# Patient Record
Sex: MALE | Race: WHITE
[De-identification: names, ages, dates, MRNs, and addresses within clinical notes are randomized per-mention and may not be internally consistent; named-entity substitution may affect disease eponyms.]

---

## 2017-10-18 NOTE — XR
EXAMINATION TYPE: XR chest 2V

 

DATE OF EXAM: 10/18/2017

 

COMPARISON: 11/3/2016

 

INDICATION: Annual physical

 

TECHNIQUE:  Frontal and lateral views of the chest are obtained.

 

FINDINGS:  

The heart size is normal.  

The pulmonary vasculature is normal.

The lungs are clear.

 

IMPRESSION:  

1. No acute pulmonary process.

## 2018-08-09 NOTE — US
EXAMINATION TYPE: US abdomen complete

 

DATE OF EXAM: 8/9/2018

 

COMPARISON: CT chest abdomen and pelvis January 16, 2015

 

CLINICAL HISTORY: R10.11 right upper quadrant ABD PAIN, R10.13 epigastric PAIN,R94.5 ABN LIVER.

 

EXAM MEASUREMENTS:

 

Liver Length:  18.5 cm   

Gallbladder Wall:  0.2 cm   

CBD:  0.5 cm

Spleen:  11.4 cm   

Right Kidney:  11.3 x 5.8 x 6.9 cm 

Left Kidney:  11.4x 6.5 x 6.4 cm   

 

 

 

Pancreas:  Obscured by bowel gas

Liver:  Increased attenuation  

Gallbladder:  No stones seen

**Evidence for sonographic Mitchell's sign:  no

CBD:  wnl 

Spleen:  wnl   

Right Kidney:  No hydronephrosis or masses seen   

Left Kidney:  No hydronephrosis or masses seen   

Upper IVC:  wnl  

Abd Aorta:  Obscured by overlying bowel gas

 

 

 

The visualized liver is slightly heterogeneously hyperechoic without worrisome mass or ductal dilatat
ion.  The intrahepatic portion of the IVC and visualized abdominal aorta are within normal limits.  T
here is no evidence of cholelithiasis.  Common bile duct is unremarkable.  The pancreas is suboptimal
ly evaluated as it is obscured by overlying bowel gas on images saved per technologist.  The spleen i
s unremarkable.  Kidneys are symmetric and free of hydronephrosis.  No renal lesions are seen.

 

IMPRESSION: Slightly heterogeneous hyperechoic appearance of liver is likely on basis of mild diffuse
 fatty infiltration.

## 2019-08-28 ENCOUNTER — HOSPITAL ENCOUNTER (OUTPATIENT)
Dept: HOSPITAL 47 - RADUSWWP | Age: 44
Discharge: HOME | End: 2019-08-28
Attending: INTERNAL MEDICINE
Payer: COMMERCIAL

## 2019-08-28 DIAGNOSIS — R16.0: ICD-10-CM

## 2019-08-28 DIAGNOSIS — K76.0: Primary | ICD-10-CM

## 2019-08-28 PROCEDURE — 76700 US EXAM ABDOM COMPLETE: CPT

## 2019-08-28 NOTE — US
EXAMINATION TYPE: US abdomen complete

 

DATE OF EXAM: 8/28/2019

 

COMPARISON:

 

CLINICAL HISTORY: R10.9 Abdominal Pain. Patient states having back pain.  NPO.  No prior surgeries.

 

EXAM MEASUREMENTS:

 

Liver Length:  18.8 cm   

Gallbladder Wall:  0.2 cm   

CBD:  0.3 cm

Spleen:  12.8 cm   

Right Kidney:  11.9 x 7.0 x 5.8 cm 

Left Kidney:  11.4 x 5.9 x 6.9 cm   

 

 

 

Pancreas:  wnl

Liver:  Appears enlarged and coarse  

Gallbladder:  wnl

**Evidence for sonographic Mitchell's sign:  neg

CBD:  wnl 

Spleen:  wnl   

Right Kidney:  wnl   

Left Kidney:  wnl   

Upper IVC:  wnl  

Abd Aorta:  No AAA visualized

 

 

 

IMPRESSION: 

1. Hepatomegaly with mild fatty infiltration of the liver

## 2020-01-06 ENCOUNTER — HOSPITAL ENCOUNTER (OUTPATIENT)
Dept: HOSPITAL 47 - RADCTMAIN | Age: 45
Discharge: HOME | End: 2020-01-06
Attending: INTERNAL MEDICINE
Payer: COMMERCIAL

## 2020-01-06 DIAGNOSIS — N40.0: Primary | ICD-10-CM

## 2020-01-06 DIAGNOSIS — R19.5: ICD-10-CM

## 2020-01-06 DIAGNOSIS — R16.2: ICD-10-CM

## 2020-01-06 PROCEDURE — 74177 CT ABD & PELVIS W/CONTRAST: CPT

## 2020-01-06 NOTE — CT
EXAMINATION TYPE: CT abdomen pelvis w con

 

DATE OF EXAM: 1/6/2020

 

COMPARISON: 1/16/2015

 

HISTORY: 44-year-old male with hepatomegaly, RUQ pain

 

TECHNIQUE: Contiguous axial scanning of the abdomen and pelvis following administration of 100 ml Iso
benjamin 300 IV contrast.  Delayed images through the kidneys and coronal/sagittal reconstructions perform
ed.

 

CT DLP: 963.8 mGycm

Automated exposure control for dose reduction was used.

 

 

FINDINGS: 

Heart normal size without pericardial effusion. Lung bases clear without pleural effusion.

 

Liver mildly enlarged at 18.8 cm without focal lesion. Portal venous system is patent. No biliary jailene
bernabe dilatation.

 

Gallbladder, adrenal glands, kidneys, and pancreas appear within normal limits. Spleen with hilar spl
enule and borderline splenomegaly at 13.9 cm.

 

No dilated small bowel, free fluid, or free air. No mesenteric or retroperitoneal lymphadenopathy.

 

Normal appendix. Moderate stool burden. Oral contrast progressed to the hepatic flexure. No pericolon
ic inflammatory change.

 

Bladder urine distended. Multiple pelvic phleboliths. Prostate gland mildly enlarged at 4.4 cm wide. 
No abnormal fluid collection in the pelvis or pelvic lymphadenopathy.

 

Bones: Stable benign bone islands in the pelvis and left hip.

 

 

IMPRESSION: 

 

1. MILD HEPATOMEGALY AT 18.8 CM. BORDERLINE SPLENOMEGALY OF 13.9 CM.

2. MILD PROSTATIC ENLARGEMENT AT 4.4 CM WIDE.

3. MODERATE STOOL BURDEN.

## 2020-02-07 ENCOUNTER — HOSPITAL ENCOUNTER (OUTPATIENT)
Dept: HOSPITAL 47 - LABWHC1 | Age: 45
Discharge: HOME | End: 2020-02-07
Payer: COMMERCIAL

## 2020-02-07 DIAGNOSIS — R74.8: Primary | ICD-10-CM

## 2020-02-07 LAB
ALBUMIN SERPL-MCNC: 4.5 G/DL (ref 3.8–4.9)
ALBUMIN/GLOB SERPL: 1.96 G/DL (ref 1.6–3.17)
ALP SERPL-CCNC: 47 U/L (ref 41–126)
ALT SERPL-CCNC: 66 U/L (ref 10–49)
AST SERPL-CCNC: 38 U/L (ref 14–35)
BILIRUB INDIRECT SERPL-MCNC: 1 MG/DL
ERYTHROCYTE [DISTWIDTH] IN BLOOD BY AUTOMATED COUNT: 4.91 M/UL (ref 4.3–5.9)
ERYTHROCYTE [DISTWIDTH] IN BLOOD: 13 % (ref 11.5–15.5)
FERRITIN SERPL-MCNC: 213.8 NG/ML (ref 22–322)
GLOBULIN SER CALC-MCNC: 2.3 G/DL (ref 1.6–3.3)
HCT VFR BLD AUTO: 44.3 % (ref 39–53)
HGB BLD-MCNC: 14.5 GM/DL (ref 13–17.5)
INR PPP: 1 (ref ?–1.2)
IRON SERPL-MCNC: 116 UG/DL (ref 65–175)
MCH RBC QN AUTO: 29.4 PG (ref 25–35)
MCHC RBC AUTO-ENTMCNC: 32.6 G/DL (ref 31–37)
MCV RBC AUTO: 90.2 FL (ref 80–100)
PLATELET # BLD AUTO: 152 K/UL (ref 150–450)
PROT SERPL-MCNC: 6.8 G/DL (ref 6.2–8.2)
PT BLD: 10.4 SEC (ref 9–12)
TIBC SERPL-MCNC: 315 UG/DL (ref 228–460)
WBC # BLD AUTO: 4.3 K/UL (ref 3.8–10.6)

## 2020-02-07 PROCEDURE — 85027 COMPLETE CBC AUTOMATED: CPT

## 2020-02-07 PROCEDURE — 84165 PROTEIN E-PHORESIS SERUM: CPT

## 2020-02-07 PROCEDURE — 86038 ANTINUCLEAR ANTIBODIES: CPT

## 2020-02-07 PROCEDURE — 85610 PROTHROMBIN TIME: CPT

## 2020-02-07 PROCEDURE — 82390 ASSAY OF CERULOPLASMIN: CPT

## 2020-02-07 PROCEDURE — 80076 HEPATIC FUNCTION PANEL: CPT

## 2020-02-07 PROCEDURE — 82728 ASSAY OF FERRITIN: CPT

## 2020-02-07 PROCEDURE — 83550 IRON BINDING TEST: CPT

## 2020-02-07 PROCEDURE — 83540 ASSAY OF IRON: CPT

## 2020-02-07 PROCEDURE — 36415 COLL VENOUS BLD VENIPUNCTURE: CPT

## 2020-02-07 PROCEDURE — 83516 IMMUNOASSAY NONANTIBODY: CPT

## 2020-02-07 PROCEDURE — 80074 ACUTE HEPATITIS PANEL: CPT

## 2020-02-10 LAB
ALBUMIN SERPL ELPH-MCNC: 4.07 G/DL (ref 3.8–4.9)
GAMMA GLOB SERPL ELPH-MCNC: 1.09 G/DL (ref 0.7–1.5)

## 2020-06-17 ENCOUNTER — HOSPITAL ENCOUNTER (OUTPATIENT)
Dept: HOSPITAL 47 - LABWHC1 | Age: 45
Discharge: HOME | End: 2020-06-17
Attending: INTERNAL MEDICINE
Payer: COMMERCIAL

## 2020-06-17 DIAGNOSIS — E78.2: ICD-10-CM

## 2020-06-17 DIAGNOSIS — F41.9: ICD-10-CM

## 2020-06-17 DIAGNOSIS — K21.0: Primary | ICD-10-CM

## 2020-06-17 LAB
ALBUMIN SERPL-MCNC: 4.7 G/DL (ref 3.8–4.9)
ALBUMIN/GLOB SERPL: 1.88 G/DL (ref 1.6–3.17)
ALP SERPL-CCNC: 44 U/L (ref 41–126)
ALT SERPL-CCNC: 41 U/L (ref 10–49)
ANION GAP SERPL CALC-SCNC: 6.9 MMOL/L (ref 4–12)
AST SERPL-CCNC: 31 U/L (ref 14–35)
BASOPHILS # BLD AUTO: 0 K/UL (ref 0–0.2)
BASOPHILS NFR BLD AUTO: 0 %
BUN SERPL-SCNC: 18 MG/DL (ref 9–27)
BUN/CREAT SERPL: 20 RATIO (ref 12–20)
CALCIUM SPEC-MCNC: 9.6 MG/DL (ref 8.7–10.3)
CHLORIDE SERPL-SCNC: 104 MMOL/L (ref 96–109)
CHOLEST SERPL-MCNC: 135 MG/DL (ref 0–200)
CO2 SERPL-SCNC: 30.1 MMOL/L (ref 21.6–31.8)
EOSINOPHIL # BLD AUTO: 0.1 K/UL (ref 0–0.7)
EOSINOPHIL NFR BLD AUTO: 2 %
ERYTHROCYTE [DISTWIDTH] IN BLOOD BY AUTOMATED COUNT: 5.28 M/UL (ref 4.3–5.9)
ERYTHROCYTE [DISTWIDTH] IN BLOOD: 12.9 % (ref 11.5–15.5)
GLOBULIN SER CALC-MCNC: 2.5 G/DL (ref 1.6–3.3)
GLUCOSE SERPL-MCNC: 93 MG/DL (ref 70–110)
HCT VFR BLD AUTO: 48.1 % (ref 39–53)
HDLC SERPL-MCNC: 50 MG/DL (ref 40–60)
HGB BLD-MCNC: 15.9 GM/DL (ref 13–17.5)
LDLC SERPL CALC-MCNC: 70 MG/DL (ref 0–131)
LYMPHOCYTES # SPEC AUTO: 1.9 K/UL (ref 1–4.8)
LYMPHOCYTES NFR SPEC AUTO: 39 %
MCH RBC QN AUTO: 30.1 PG (ref 25–35)
MCHC RBC AUTO-ENTMCNC: 33 G/DL (ref 31–37)
MCV RBC AUTO: 91.2 FL (ref 80–100)
MONOCYTES # BLD AUTO: 0.3 K/UL (ref 0–1)
MONOCYTES NFR BLD AUTO: 6 %
NEUTROPHILS # BLD AUTO: 2.4 K/UL (ref 1.3–7.7)
NEUTROPHILS NFR BLD AUTO: 50 %
PLATELET # BLD AUTO: 158 K/UL (ref 150–450)
POTASSIUM SERPL-SCNC: 4.6 MMOL/L (ref 3.5–5.5)
PROT SERPL-MCNC: 7.2 G/DL (ref 6.2–8.2)
SODIUM SERPL-SCNC: 141 MMOL/L (ref 135–145)
TRIGL SERPL-MCNC: 75 MG/DL (ref 0–149)
VLDLC SERPL CALC-MCNC: 15 MG/DL (ref 5–40)
WBC # BLD AUTO: 4.8 K/UL (ref 3.8–10.6)

## 2020-06-17 PROCEDURE — 84443 ASSAY THYROID STIM HORMONE: CPT

## 2020-06-17 PROCEDURE — 80061 LIPID PANEL: CPT

## 2020-06-17 PROCEDURE — 80053 COMPREHEN METABOLIC PANEL: CPT

## 2020-06-17 PROCEDURE — 36415 COLL VENOUS BLD VENIPUNCTURE: CPT

## 2020-06-17 PROCEDURE — 85025 COMPLETE CBC W/AUTO DIFF WBC: CPT

## 2021-01-26 ENCOUNTER — HOSPITAL ENCOUNTER (OUTPATIENT)
Dept: HOSPITAL 47 - ORWHC2ENDO | Age: 46
Discharge: HOME | End: 2021-01-26
Attending: SURGERY
Payer: COMMERCIAL

## 2021-01-26 VITALS — RESPIRATION RATE: 20 BRPM | DIASTOLIC BLOOD PRESSURE: 72 MMHG | HEART RATE: 91 BPM | SYSTOLIC BLOOD PRESSURE: 117 MMHG

## 2021-01-26 VITALS — TEMPERATURE: 98.5 F

## 2021-01-26 DIAGNOSIS — F41.9: ICD-10-CM

## 2021-01-26 DIAGNOSIS — E78.5: ICD-10-CM

## 2021-01-26 DIAGNOSIS — R19.4: Primary | ICD-10-CM

## 2021-01-26 DIAGNOSIS — Z87.891: ICD-10-CM

## 2021-01-26 DIAGNOSIS — Z86.16: ICD-10-CM

## 2021-01-26 DIAGNOSIS — K21.9: ICD-10-CM

## 2021-01-26 DIAGNOSIS — Z79.899: ICD-10-CM

## 2021-01-26 PROCEDURE — 45378 DIAGNOSTIC COLONOSCOPY: CPT

## 2021-01-26 NOTE — P.PCN
Date of Procedure: 01/26/21


Procedure(s) Performed: 


shePREOPERATIVE DIAGNOSIS: Change in bowel habits, right upper quadrant pain


POSTOPERATIVE DIAGNOSIS: Normal exam


PROCEDURE: Colonoscopy 


ANESTHESIA: MAC


SURGEON: Chepe Galvan M.D.


SPECIMENS: None


ENDOSCOPIC PROCEDURE:  The patient was placed on the endoscopy table in the left

decubitus position.  The Olympus colonoscope was inserted into the anus and 

passed under direct visualization to the base of the cecum.  The appendiceal 

orifice was visualized.  From that point the scope was slowly withdrawn 

inspecting all surfaces carefully.  There were no neoplastic inflammatory or 

polypoid lesions throughout the cecum, ascending, transverse, descending, 

sigmoid and rectum.  There was no visible diverticulosis noted.  Digital rectal 

examination was normal.  The patient was taken to the recovery room in stable 

condition per anesthesia guidelines.


RECOMMENDATIONS: Resume diet.  Follow-up colonoscopy 10 years.

## 2021-01-26 NOTE — P.GSHP
History of Present Illness


H&P Date: 01/26/21


Chief Complaint: Change in bowel habits





45-year-old male describes intermittent constipation next with diarrhea.  Last 

colonoscopy 14 years ago and that was normal.  No family history of colon 

cancer.  No rectal bleeding.  Patient has chronic right upper quadrant pain.





Past Medical History


Past Medical History: GERD/Reflux, Hyperlipidemia


Additional Past Medical History / Comment(s): POSITIVE FOR COVID IN NOVEMBER, 2020.


History of Any Multi-Drug Resistant Organisms: None Reported


Past Surgical History: No Surgical Hx Reported


Additional Past Surgical History / Comment(s): COLONOSCOPY, EGD.


Past Anesthesia/Blood Transfusion Reactions: Motion Sickness


Additional Past Anesthesia/Blood Transfusion Reaction / Comment(s): HAS NEVER 

HAD GENERAL ANESTHESIA.


Past Psychological History: Anxiety


Smoking Status: Former smoker


Past Alcohol Use History: Occasional


Additional Past Alcohol Use History / Comment(s): QUIT SMOKING WITHIN THE PAST 

YEAR.


Past Drug Use History: None Reported





Medications and Allergies


                                Home Medications











 Medication  Instructions  Recorded  Confirmed  Type


 


ALPRAZolam [Xanax] 0.5 mg PO TID PRN 01/22/21 01/26/21 History


 


Atorvastatin [Lipitor] 10 mg PO HS 01/22/21 01/26/21 History








                                    Allergies











Allergy/AdvReac Type Severity Reaction Status Date / Time


 


No Known Allergies Allergy   Verified 01/26/21 09:13














Surgical - Exam


                                   Vital Signs











Temp Pulse Resp BP Pulse Ox


 


 98.5 F   118 H  17   133/81   99 


 


 01/26/21 09:12  01/26/21 09:12  01/26/21 09:12  01/26/21 09:12  01/26/21 09:12

















Physical exam:


General: Well-developed, well-nourished


HEENT: Normocephalic, sclerae nonicteric


Abdomen: Nontender, nondistended


Extremities: No edema


Neuro: Alert and oriented





Assessment and Plan


(1) Change in bowel habits


Narrative/Plan: 


Will proceed with colonoscopy


Current Visit: Yes   Status: Acute   Code(s): R19.4 - CHANGE IN BOWEL HABIT   

SNOMED Code(s): 053656159

## 2021-04-13 ENCOUNTER — HOSPITAL ENCOUNTER (OUTPATIENT)
Dept: HOSPITAL 47 - LABWHC1 | Age: 46
Discharge: HOME | End: 2021-04-13
Attending: INTERNAL MEDICINE
Payer: COMMERCIAL

## 2021-04-13 DIAGNOSIS — E78.2: Primary | ICD-10-CM

## 2021-04-13 DIAGNOSIS — R16.0: ICD-10-CM

## 2021-04-13 DIAGNOSIS — F41.9: ICD-10-CM

## 2021-04-13 LAB
ALBUMIN SERPL-MCNC: 4.9 G/DL (ref 3.8–4.9)
ALBUMIN/GLOB SERPL: 2.04 G/DL (ref 1.6–3.17)
ALP SERPL-CCNC: 41 U/L (ref 41–126)
ALT SERPL-CCNC: 45 U/L (ref 10–49)
ANION GAP SERPL CALC-SCNC: 9.4 MMOL/L (ref 4–12)
AST SERPL-CCNC: 29 U/L (ref 14–35)
BASOPHILS # BLD AUTO: 0.01 X 10*3/UL (ref 0–0.1)
BASOPHILS NFR BLD AUTO: 0.2 %
BUN SERPL-SCNC: 18 MG/DL (ref 9–27)
BUN/CREAT SERPL: 20 RATIO (ref 12–20)
CALCIUM SPEC-MCNC: 9.9 MG/DL (ref 8.7–10.3)
CHLORIDE SERPL-SCNC: 105 MMOL/L (ref 96–109)
CHOLEST SERPL-MCNC: 146 MG/DL (ref 0–200)
CO2 SERPL-SCNC: 26.6 MMOL/L (ref 21.6–31.8)
EOSINOPHIL # BLD AUTO: 0.09 X 10*3/UL (ref 0.04–0.35)
EOSINOPHIL NFR BLD AUTO: 1.6 %
ERYTHROCYTE [DISTWIDTH] IN BLOOD BY AUTOMATED COUNT: 5.06 X 10*6/UL (ref 4.4–5.6)
ERYTHROCYTE [DISTWIDTH] IN BLOOD: 12.9 % (ref 11.5–14.5)
GLOBULIN SER CALC-MCNC: 2.4 G/DL (ref 1.6–3.3)
GLUCOSE SERPL-MCNC: 109 MG/DL (ref 70–110)
HBA1C MFR BLD: 5.4 % (ref 4–6)
HCT VFR BLD AUTO: 47.1 % (ref 39.6–50)
HDLC SERPL-MCNC: 52 MG/DL (ref 40–60)
HGB BLD-MCNC: 14.9 G/DL (ref 13–17)
LDLC SERPL CALC-MCNC: 76 MG/DL (ref 0–131)
LYMPHOCYTES # SPEC AUTO: 2.38 X 10*3/UL (ref 0.9–5)
LYMPHOCYTES NFR SPEC AUTO: 41.6 %
MCH RBC QN AUTO: 29.4 PG (ref 27–32)
MCHC RBC AUTO-ENTMCNC: 31.6 G/DL (ref 32–37)
MCV RBC AUTO: 93.1 FL (ref 80–97)
MONOCYTES # BLD AUTO: 0.45 X 10*3/UL (ref 0.2–1)
MONOCYTES NFR BLD AUTO: 7.9 %
NEUTROPHILS # BLD AUTO: 2.77 X 10*3/UL (ref 1.8–7.7)
NEUTROPHILS NFR BLD AUTO: 48.4 %
PLATELET # BLD AUTO: 182 X 10*3/UL (ref 140–440)
POTASSIUM SERPL-SCNC: 4.1 MMOL/L (ref 3.5–5.5)
PROT SERPL-MCNC: 7.3 G/DL (ref 6.2–8.2)
SODIUM SERPL-SCNC: 141 MMOL/L (ref 135–145)
TRIGL SERPL-MCNC: 90 MG/DL (ref 0–149)
VLDLC SERPL CALC-MCNC: 18 MG/DL (ref 5–40)
WBC # BLD AUTO: 5.72 X 10*3/UL (ref 4.5–10)

## 2021-04-13 PROCEDURE — 36415 COLL VENOUS BLD VENIPUNCTURE: CPT

## 2021-04-13 PROCEDURE — 80061 LIPID PANEL: CPT

## 2021-04-13 PROCEDURE — 84443 ASSAY THYROID STIM HORMONE: CPT

## 2021-04-13 PROCEDURE — 85025 COMPLETE CBC W/AUTO DIFF WBC: CPT

## 2021-04-13 PROCEDURE — 80053 COMPREHEN METABOLIC PANEL: CPT

## 2021-04-13 PROCEDURE — 83036 HEMOGLOBIN GLYCOSYLATED A1C: CPT

## 2021-09-27 ENCOUNTER — HOSPITAL ENCOUNTER (OUTPATIENT)
Dept: HOSPITAL 47 - LABWHC1 | Age: 46
Discharge: HOME | End: 2021-09-27
Attending: INTERNAL MEDICINE
Payer: COMMERCIAL

## 2021-09-27 DIAGNOSIS — E16.2: ICD-10-CM

## 2021-09-27 DIAGNOSIS — R16.0: ICD-10-CM

## 2021-09-27 DIAGNOSIS — N40.0: ICD-10-CM

## 2021-09-27 DIAGNOSIS — R00.1: ICD-10-CM

## 2021-09-27 DIAGNOSIS — J30.2: ICD-10-CM

## 2021-09-27 DIAGNOSIS — Z00.00: Primary | ICD-10-CM

## 2021-09-27 DIAGNOSIS — E78.2: ICD-10-CM

## 2021-09-27 LAB
ALBUMIN SERPL-MCNC: 4.9 G/DL (ref 3.8–4.9)
ALBUMIN/GLOB SERPL: 1.75 G/DL (ref 1.6–3.17)
ALP SERPL-CCNC: 46 U/L (ref 41–126)
ALT SERPL-CCNC: 85 U/L (ref 10–49)
ANION GAP SERPL CALC-SCNC: 11.4 MMOL/L (ref 4–12)
AST SERPL-CCNC: 41 U/L (ref 14–35)
BASOPHILS # BLD AUTO: 0.03 X 10*3/UL (ref 0–0.1)
BASOPHILS NFR BLD AUTO: 0.5 %
BUN SERPL-SCNC: 17 MG/DL (ref 9–27)
BUN/CREAT SERPL: 18.89 RATIO (ref 12–20)
CALCIUM SPEC-MCNC: 9.6 MG/DL (ref 8.7–10.3)
CHLORIDE SERPL-SCNC: 103 MMOL/L (ref 96–109)
CHOLEST SERPL-MCNC: 184 MG/DL (ref 0–200)
CO2 SERPL-SCNC: 26.6 MMOL/L (ref 21.6–31.8)
EOSINOPHIL # BLD AUTO: 0.06 X 10*3/UL (ref 0.04–0.35)
EOSINOPHIL NFR BLD AUTO: 1.1 %
ERYTHROCYTE [DISTWIDTH] IN BLOOD BY AUTOMATED COUNT: 5.11 X 10*6/UL (ref 4.4–5.6)
ERYTHROCYTE [DISTWIDTH] IN BLOOD: 13.2 % (ref 11.5–14.5)
GLOBULIN SER CALC-MCNC: 2.8 G/DL (ref 1.6–3.3)
GLUCOSE SERPL-MCNC: 95 MG/DL (ref 70–110)
HBA1C MFR BLD: 5.5 % (ref 4–6)
HCT VFR BLD AUTO: 47.5 % (ref 39.6–50)
HDLC SERPL-MCNC: 62 MG/DL (ref 40–60)
HGB BLD-MCNC: 15.2 G/DL (ref 13–17)
LDLC SERPL CALC-MCNC: 87.8 MG/DL (ref 0–131)
LYMPHOCYTES # SPEC AUTO: 1.94 X 10*3/UL (ref 0.9–5)
LYMPHOCYTES NFR SPEC AUTO: 34.5 %
MCH RBC QN AUTO: 29.7 PG (ref 27–32)
MCHC RBC AUTO-ENTMCNC: 32 G/DL (ref 32–37)
MCV RBC AUTO: 93 FL (ref 80–97)
MONOCYTES # BLD AUTO: 0.45 X 10*3/UL (ref 0.2–1)
MONOCYTES NFR BLD AUTO: 8 %
NEUTROPHILS # BLD AUTO: 3.11 X 10*3/UL (ref 1.8–7.7)
NEUTROPHILS NFR BLD AUTO: 55.4 %
PH UR: 5.5 [PH] (ref 5–8)
PLATELET # BLD AUTO: 179 X 10*3/UL (ref 140–440)
POTASSIUM SERPL-SCNC: 4.3 MMOL/L (ref 3.5–5.5)
PROT SERPL-MCNC: 7.7 G/DL (ref 6.2–8.2)
PSA SERPL-MCNC: 0.6 NG/ML (ref 0–2.5)
SODIUM SERPL-SCNC: 141 MMOL/L (ref 135–145)
SP GR UR: 1.02 (ref 1–1.03)
TRIGL SERPL-MCNC: 171 MG/DL (ref 0–149)
UROBILINOGEN UR QL STRIP: <2 MG/DL (ref ?–2)
VLDLC SERPL CALC-MCNC: 34.2 MG/DL (ref 5–40)
WBC # BLD AUTO: 5.62 X 10*3/UL (ref 4.5–10)

## 2021-09-27 PROCEDURE — 83036 HEMOGLOBIN GLYCOSYLATED A1C: CPT

## 2021-09-27 PROCEDURE — 84153 ASSAY OF PSA TOTAL: CPT

## 2021-09-27 PROCEDURE — 86003 ALLG SPEC IGE CRUDE XTRC EA: CPT

## 2021-09-27 PROCEDURE — 80053 COMPREHEN METABOLIC PANEL: CPT

## 2021-09-27 PROCEDURE — 84443 ASSAY THYROID STIM HORMONE: CPT

## 2021-09-27 PROCEDURE — 36415 COLL VENOUS BLD VENIPUNCTURE: CPT

## 2021-09-27 PROCEDURE — 81003 URINALYSIS AUTO W/O SCOPE: CPT

## 2021-09-27 PROCEDURE — 85025 COMPLETE CBC W/AUTO DIFF WBC: CPT

## 2021-09-27 PROCEDURE — 80061 LIPID PANEL: CPT

## 2021-09-27 PROCEDURE — 82785 ASSAY OF IGE: CPT

## 2021-10-22 ENCOUNTER — HOSPITAL ENCOUNTER (OUTPATIENT)
Dept: HOSPITAL 47 - LABWHC1 | Age: 46
Discharge: HOME | End: 2021-10-22
Attending: INTERNAL MEDICINE
Payer: COMMERCIAL

## 2021-10-22 DIAGNOSIS — R06.4: Primary | ICD-10-CM

## 2021-10-22 LAB
ALBUMIN SERPL-MCNC: 4.7 G/DL (ref 3.8–4.9)
ALBUMIN/GLOB SERPL: 1.94 G/DL (ref 1.6–3.17)
ALP SERPL-CCNC: 39 U/L (ref 41–126)
ALT SERPL-CCNC: 46 U/L (ref 10–49)
AST SERPL-CCNC: 30 U/L (ref 14–35)
BILIRUB INDIRECT SERPL-MCNC: 1.05 MG/DL (ref 0.2–1)
CHOLEST SERPL-MCNC: 145 MG/DL (ref 0–200)
GLOBULIN SER CALC-MCNC: 2.4 G/DL (ref 1.6–3.3)
HDLC SERPL-MCNC: 55.6 MG/DL (ref 40–60)
LDLC SERPL CALC-MCNC: 77.7 MG/DL (ref 0–131)
PROT SERPL-MCNC: 7.1 G/DL (ref 6.2–8.2)
TRIGL SERPL-MCNC: 58.5 MG/DL (ref 0–149)
VLDLC SERPL CALC-MCNC: 11.7 MG/DL (ref 5–40)

## 2021-10-22 PROCEDURE — 36415 COLL VENOUS BLD VENIPUNCTURE: CPT

## 2021-10-22 PROCEDURE — 80061 LIPID PANEL: CPT

## 2021-10-22 PROCEDURE — 80076 HEPATIC FUNCTION PANEL: CPT

## 2022-03-24 ENCOUNTER — HOSPITAL ENCOUNTER (OUTPATIENT)
Dept: HOSPITAL 47 - LABWHC1 | Age: 47
Discharge: HOME | End: 2022-03-24
Attending: INTERNAL MEDICINE
Payer: COMMERCIAL

## 2022-03-24 DIAGNOSIS — R94.5: Primary | ICD-10-CM

## 2022-03-24 DIAGNOSIS — E78.2: ICD-10-CM

## 2022-03-24 LAB
ALBUMIN SERPL-MCNC: 4.9 G/DL (ref 3.8–4.9)
ALBUMIN/GLOB SERPL: 1.96 G/DL (ref 1.6–3.17)
ALP SERPL-CCNC: 42 U/L (ref 41–126)
ALT SERPL-CCNC: 48 U/L (ref 10–49)
ANION GAP SERPL CALC-SCNC: 9.6 MMOL/L (ref 10–18)
AST SERPL-CCNC: 25 U/L (ref 14–35)
BUN SERPL-SCNC: 12.9 MG/DL (ref 9–27)
BUN/CREAT SERPL: 16.13 RATIO (ref 12–20)
CALCIUM SPEC-MCNC: 9.7 MG/DL (ref 8.7–10.3)
CHLORIDE SERPL-SCNC: 102 MMOL/L (ref 96–109)
CHOLEST SERPL-MCNC: 159 MG/DL (ref 0–200)
CO2 SERPL-SCNC: 27.4 MMOL/L (ref 20–27.5)
GLOBULIN SER CALC-MCNC: 2.5 G/DL (ref 1.6–3.3)
GLUCOSE SERPL-MCNC: 104 MG/DL (ref 70–110)
HDLC SERPL-MCNC: 53.3 MG/DL (ref 40–60)
LDLC SERPL CALC-MCNC: 84.9 MG/DL (ref 0–131)
POTASSIUM SERPL-SCNC: 4.4 MMOL/L (ref 3.5–5.5)
PROT SERPL-MCNC: 7.4 G/DL (ref 6.2–8.2)
SODIUM SERPL-SCNC: 139 MMOL/L (ref 135–145)
TRIGL SERPL-MCNC: 104 MG/DL (ref 0–149)
VLDLC SERPL CALC-MCNC: 20.8 MG/DL (ref 5–40)

## 2022-03-24 PROCEDURE — 80061 LIPID PANEL: CPT

## 2022-03-24 PROCEDURE — 80053 COMPREHEN METABOLIC PANEL: CPT

## 2022-03-24 PROCEDURE — 36415 COLL VENOUS BLD VENIPUNCTURE: CPT

## 2023-12-01 ENCOUNTER — HOSPITAL ENCOUNTER (OUTPATIENT)
Dept: HOSPITAL 47 - LABWHC1 | Age: 48
Discharge: HOME | End: 2023-12-01
Attending: INTERNAL MEDICINE
Payer: COMMERCIAL

## 2023-12-01 DIAGNOSIS — R16.0: ICD-10-CM

## 2023-12-01 DIAGNOSIS — K21.00: ICD-10-CM

## 2023-12-01 DIAGNOSIS — E16.2: ICD-10-CM

## 2023-12-01 DIAGNOSIS — E78.2: Primary | ICD-10-CM

## 2023-12-01 LAB
ALBUMIN SERPL-MCNC: 4.6 G/DL (ref 3.8–4.9)
ALBUMIN/GLOB SERPL: 1.84 RATIO (ref 1.6–3.17)
ALP SERPL-CCNC: 47 U/L (ref 41–126)
ALT SERPL-CCNC: 65 U/L (ref 10–49)
ANION GAP SERPL CALC-SCNC: 10.4 MMOL/L (ref 4–12)
AST SERPL-CCNC: 32 U/L (ref 14–35)
BASOPHILS # BLD AUTO: 0.02 X 10*3/UL (ref 0–0.1)
BASOPHILS NFR BLD AUTO: 0.4 %
BUN SERPL-SCNC: 13.9 MG/DL (ref 9–27)
BUN/CREAT SERPL: 15.44 RATIO (ref 12–20)
CALCIUM SPEC-MCNC: 9.9 MG/DL (ref 8.7–10.3)
CHLORIDE SERPL-SCNC: 104 MMOL/L (ref 96–109)
CHOLEST SERPL-MCNC: 248 MG/DL (ref 0–200)
CO2 SERPL-SCNC: 26.6 MMOL/L (ref 21.6–31.8)
EOSINOPHIL # BLD AUTO: 0.05 X 10*3/UL (ref 0.04–0.35)
EOSINOPHIL NFR BLD AUTO: 1 %
ERYTHROCYTE [DISTWIDTH] IN BLOOD BY AUTOMATED COUNT: 5.16 X 10*6/UL (ref 4.4–5.6)
ERYTHROCYTE [DISTWIDTH] IN BLOOD: 12.9 % (ref 11.5–14.5)
GLOBULIN SER CALC-MCNC: 2.5 G/DL (ref 1.6–3.3)
GLUCOSE SERPL-MCNC: 95 MG/DL (ref 70–110)
HCT VFR BLD AUTO: 47.8 % (ref 39.6–50)
HDLC SERPL-MCNC: 57.3 MG/DL (ref 40–60)
HGB BLD-MCNC: 15 G/DL (ref 13–17)
IMM GRANULOCYTES BLD QL AUTO: 0.2 %
LDLC SERPL CALC-MCNC: 172.9 MG/DL (ref 0–131)
LYMPHOCYTES # SPEC AUTO: 2.14 X 10*3/UL (ref 0.9–5)
LYMPHOCYTES NFR SPEC AUTO: 43.7 %
MAGNESIUM SPEC-SCNC: 2.1 MG/DL (ref 1.5–2.4)
MCH RBC QN AUTO: 29.1 PG (ref 27–32)
MCHC RBC AUTO-ENTMCNC: 31.4 G/DL (ref 32–37)
MCV RBC AUTO: 92.6 FL (ref 80–97)
MONOCYTES # BLD AUTO: 0.51 X 10*3/UL (ref 0.2–1)
MONOCYTES NFR BLD AUTO: 10.4 %
NEUTROPHILS # BLD AUTO: 2.17 X 10*3/UL (ref 1.8–7.7)
NEUTROPHILS NFR BLD AUTO: 44.3 %
NRBC BLD AUTO-RTO: 0 X 10*3/UL (ref 0–0.01)
PLATELET # BLD AUTO: 191 X 10*3/UL (ref 140–440)
POTASSIUM SERPL-SCNC: 4.6 MMOL/L (ref 3.5–5.5)
PROT SERPL-MCNC: 7.1 G/DL (ref 6.2–8.2)
SODIUM SERPL-SCNC: 141 MMOL/L (ref 135–145)
TRIGL SERPL-MCNC: 88.9 MG/DL (ref 0–149)
VLDLC SERPL CALC-MCNC: 17.78 MG/DL (ref 5–40)
WBC # BLD AUTO: 4.9 X 10*3/UL (ref 4.5–10)

## 2023-12-01 PROCEDURE — 85025 COMPLETE CBC W/AUTO DIFF WBC: CPT

## 2023-12-01 PROCEDURE — 80053 COMPREHEN METABOLIC PANEL: CPT

## 2023-12-01 PROCEDURE — 83735 ASSAY OF MAGNESIUM: CPT

## 2023-12-01 PROCEDURE — 36415 COLL VENOUS BLD VENIPUNCTURE: CPT

## 2023-12-01 PROCEDURE — 80061 LIPID PANEL: CPT

## 2023-12-01 PROCEDURE — 83036 HEMOGLOBIN GLYCOSYLATED A1C: CPT

## 2023-12-01 PROCEDURE — 84443 ASSAY THYROID STIM HORMONE: CPT
